# Patient Record
Sex: MALE | Race: WHITE | NOT HISPANIC OR LATINO | Employment: FULL TIME | URBAN - METROPOLITAN AREA
[De-identification: names, ages, dates, MRNs, and addresses within clinical notes are randomized per-mention and may not be internally consistent; named-entity substitution may affect disease eponyms.]

---

## 2023-11-30 DIAGNOSIS — L65.9 NONSCARRING HAIR LOSS, UNSPECIFIED: ICD-10-CM

## 2023-12-04 NOTE — TELEPHONE ENCOUNTER
Pt called back regarding this message stating that he was supposed to have refills on this med and that you prescribed this medication for his hair loss. Please advise

## 2023-12-04 NOTE — TELEPHONE ENCOUNTER
Please advise on medication d/t dx code and listed PCP. Pt has been seen by Dr Drake several times. Last appt 3/21/23

## 2023-12-05 RX ORDER — DOXYCYCLINE HYCLATE 50 MG/1
50 CAPSULE ORAL DAILY
Qty: 90 CAPSULE | OUTPATIENT
Start: 2023-12-05 | End: 2024-03-04

## 2023-12-05 NOTE — TELEPHONE ENCOUNTER
Pt states nevermind on this RX when I told him he hasn't been seen in this office in a while and that we had to schedule a physical or a med review.

## 2023-12-11 PROBLEM — F41.1 GENERALIZED ANXIETY DISORDER: Status: ACTIVE | Noted: 2023-12-11

## 2023-12-13 ENCOUNTER — APPOINTMENT (OUTPATIENT)
Dept: PRIMARY CARE | Facility: CLINIC | Age: 28
End: 2023-12-13
Payer: COMMERCIAL

## 2023-12-18 PROBLEM — Z78.9 MEDICAL HOME PATIENT: Status: ACTIVE | Noted: 2023-12-18

## 2023-12-18 PROBLEM — G47.00 INSOMNIA: Status: ACTIVE | Noted: 2023-12-18

## 2024-01-18 ENCOUNTER — OFFICE VISIT (OUTPATIENT)
Dept: UROLOGY | Facility: HOSPITAL | Age: 29
End: 2024-01-18
Payer: COMMERCIAL

## 2024-01-18 DIAGNOSIS — I86.1 VARICOCELE: ICD-10-CM

## 2024-01-18 DIAGNOSIS — R39.9 LOWER URINARY TRACT SYMPTOMS (LUTS): Primary | ICD-10-CM

## 2024-01-18 LAB
POC APPEARANCE, URINE: CLEAR
POC BILIRUBIN, URINE: NEGATIVE
POC BLOOD, URINE: NEGATIVE
POC COLOR, URINE: YELLOW
POC GLUCOSE, URINE: NEGATIVE MG/DL
POC KETONES, URINE: NEGATIVE MG/DL
POC LEUKOCYTES, URINE: NEGATIVE
POC NITRITE,URINE: NEGATIVE
POC PH, URINE: 7.5 PH
POC PROTEIN, URINE: NEGATIVE MG/DL
POC SPECIFIC GRAVITY, URINE: 1.02
POC UROBILINOGEN, URINE: 0.2 EU/DL

## 2024-01-18 PROCEDURE — 99214 OFFICE O/P EST MOD 30 MIN: CPT | Performed by: UROLOGY

## 2024-01-18 PROCEDURE — 99204 OFFICE O/P NEW MOD 45 MIN: CPT | Performed by: UROLOGY

## 2024-01-18 PROCEDURE — 81003 URINALYSIS AUTO W/O SCOPE: CPT | Performed by: UROLOGY

## 2024-01-18 NOTE — PROGRESS NOTES
NAME:John Backh  DATE: 1/18/2024               Subjective:   Chief complaint: hydrocele    HPI:  28 y.o. 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  Concerned he has a varicocele.  Has occasional dull pain.  Happens couple days a week.  Concerned about fertility.      Urinating ok.     Not actively trying to have kids at this time.  Dating.       No past medical history on file.  No past surgical history on file.  Social History     Tobacco Use    Smoking status: Not on file    Smokeless tobacco: Not on file   Substance Use Topics    Alcohol use: Not on file     No family history on file.  [unfilled]  Current Outpatient Medications on File Prior to Visit   Medication Sig Dispense Refill    [DISCONTINUED] sertraline (Zoloft) 50 mg tablet Take 1 tablet (50 mg) by mouth once daily.       No current facility-administered medications on file prior to visit.     John has no allergies on file.     Review of Systems    14 point ROS reviewed and discussed with the patient. Pertinent positives/negatives discussed in the History of Present Illness (HPI).      Objective:     There is no height or weight on file to calculate BMI.   There were no vitals taken for this visit.     Physical Exam   1. Constitutional: NAD, Well-developed, Well-nourished  2. Respiratory: Unlabored breathing, no audible wheezes, no use of accessory muscles   3. Cardiovascular: No JVD, extremities perfused, no edema  4. Abdomen: Soft, non-tender, non-distended, no masses or hernia.  No CVA tenderness.    5. Skin: no visible lesions, plaque, rashes, jaundice  6. Neuro: Gait normal, no focal neurologic deficit  7. Psychiatric: Mood and affect normal and appropriate, alert and oriented  8. :    Phallus: Normal, no lesions.     Meatus: Orthotopic and patent.   Testes:  Descended bilaterally, symmetric, without tenderness or mass.   Epididymis is normal bilaterally.  No hydrocele.  Grade I left  varicocele.   Scrotum: No lesions.   Inguinal canal: No hernia or tenderness.       Labs    Lab Results   Component Value Date    CREATININE 1.0 04/28/2022     Lab Results   Component Value Date    CHOL 150 04/28/2022     Lab Results   Component Value Date    HDL 57 04/28/2022     Lab Results   Component Value Date    CHHDL 2.6 04/28/2022     Lab Results   Component Value Date    TRIG 33 (L) 04/28/2022     Lab Results   Component Value Date    HCT 45.3 04/28/2022       Imaging    Procedures:     PVR:    Assessment/Plan:   John Dangelo presents with       1. Lower urinary tract symptoms (LUTS)    2. Varicocele      Varicocele    We discussed that varicoceles are extremely common, seen in approximately 15% of the general population, and even more some in primary and secondary infertility, 35% and 75% respectively.  Also, that it is the most common surgically correctable cause of male infertility.      We discussed the indications for varicocelectomy being a clinically palpable varicocele with documented infertility and an abnormal semen analysis.  Other indications include pain, hypogonadism, and testicular hypotrophy.    Different surgical approaches were discussed, with my preference being a microsurgical subinguinal incision, due to it lower recurrence and complication rates.  Complications including, but not limited too, pain, bleeding, infection, hydrocele, recurrence or nonresolution, injury to vas deferens, injury to the arterial supply resulting in testicular atrophy, as well as anesthetic complications were discussed.     Will get SA

## 2024-01-25 ENCOUNTER — ANCILLARY PROCEDURE (OUTPATIENT)
Dept: ENDOCRINOLOGY | Facility: CLINIC | Age: 29
End: 2024-01-25
Payer: COMMERCIAL

## 2024-01-25 DIAGNOSIS — I86.1 VARICOCELE: ICD-10-CM

## 2024-01-25 LAB
% EX RESIDUAL CYTOPLASM (SEMEN): 0 %
% HEAD DEFECTS (SEMEN): 99 %
% NECK MIDPIECE (SEMEN): 44.5 %
% NORMAL (SEMEN): 1 % (ref 4–?)
% TAIL DEFECTS (SEMEN): 9.5 %
ABSTINENCE (DAYS): 2 DAYS (ref 2–7)
AGGLUTINATION (SEMEN): NO
ANALYZED TIME:: ABNORMAL
ANDROLOGY LAB ID#: ABNORMAL
CLUMPS (SEMEN): YES
COLLECTED COMPLETELY: YES
COLLECTION LOCATION:: ABNORMAL
COLLECTION METHOD:: ABNORMAL
CONCENTRATION(SEMEN): 35.4 MILL/ML (ref 15–?)
DEBRIS (SEMEN): YES
NON PROG. MOTILITY (SEMEN): 14 %
PROG. MOTILITY (SEMEN): 57 % (ref 32–?)
RECEIVED TIME:: ABNORMAL
SEMEN APPEARANCE: NORMAL
SEMEN LIQUEFACTION: NORMAL
SEMEN VISCOSITY: NORMAL
TOT. NO OF NORM. MOTILE SPERM (SEMEN): 0.5 MILL
TOT. NO OF NORM. SPERM (SEMEN): 0.35 MILL
TOTAL MOTILITY (SEMEN): 71 % (ref 40–?)
TOTAL NO OF MOTILE (SEMEN): 35.06 MILL
TOTAL NO OF SPERM (SEMEN): 49.56 MILL (ref 39–?)
VOLUME (SEMEN): 1.4 ML (ref 1.5–?)

## 2024-01-25 PROCEDURE — 89322 SEMEN ANAL STRICT CRITERIA: CPT | Performed by: OBSTETRICS & GYNECOLOGY

## 2024-01-29 NOTE — PROGRESS NOTES
FUV    Virtual or Telephone Consent    An interactive audio and video telecommunication system which permits real time communications between the patient (at the originating site) and provider (at the distant site) was utilized to provide this telehealth service.   Verbal consent was requested and obtained from John Dangelo on this date, 01/30/24 for a telehealth visit.      Last seen - 1/18/24     HISTORY OF PRESENT ILLNESS:   John Dangelo is a 28 y.o. male who is being seen today for fuv.    Pt seen prior for varicocele.  Pt with left varicocele.  Minimal discomfort.      SA (1/25/24) - 1.4/35/57%/1% TMS 35.06    Not trying to have kids at this time    PAST MEDICAL HISTORY:  No past medical history on file.    PAST SURGICAL HISTORY:  No past surgical history on file.     ALLERGIES:   Not on File     MEDICATIONS:   Current Outpatient Medications   Medication Instructions        PHYSICAL EXAM:  There were no vitals taken for this visit.  Constitutional: Patient appears well-developed and well-nourished. No distress.    Pulmonary/Chest: Effort normal. No respiratory distress.   Musculoskeletal: Normal range of motion.    Neurological: Alert and oriented to person, place, and time.  Psychiatric: Normal mood and affect. Behavior is normal. Thought content normal.      Labs  Lab Results   Component Value Date    CREATININE 1.0 04/28/2022     Lab Results   Component Value Date    CHOL 150 04/28/2022     Lab Results   Component Value Date    HDL 57 04/28/2022     Lab Results   Component Value Date    CHHDL 2.6 04/28/2022     Lab Results   Component Value Date    TRIG 33 (L) 04/28/2022     Lab Results   Component Value Date    HCT 45.3 04/28/2022       Assessment:      1. Varicocele        2. Teratospermia          John Dangelo is a 28 y.o. male here for FUV    Reviewed SA - teratospermia.       Plan:   1)  Discussed SA and options for treating his varicocele.  He is minimally symptomatic.  Not actively trying to have a  child at this time.      Will continue to monitor and fu if symptoms change, having trouble conceiving or any other questions or concerns

## 2024-01-30 ENCOUNTER — TELEMEDICINE (OUTPATIENT)
Dept: UROLOGY | Facility: HOSPITAL | Age: 29
End: 2024-01-30
Payer: COMMERCIAL

## 2024-01-30 DIAGNOSIS — I86.1 VARICOCELE: Primary | ICD-10-CM

## 2024-01-30 DIAGNOSIS — R86.8 TERATOSPERMIA: ICD-10-CM

## 2024-01-30 PROCEDURE — 99213 OFFICE O/P EST LOW 20 MIN: CPT | Performed by: UROLOGY

## 2024-06-05 ENCOUNTER — LAB REQUISITION (OUTPATIENT)
Dept: LAB | Facility: HOSPITAL | Age: 29
End: 2024-06-05
Payer: COMMERCIAL

## 2024-06-05 DIAGNOSIS — J02.9 ACUTE PHARYNGITIS, UNSPECIFIED: ICD-10-CM

## 2024-06-05 PROCEDURE — 87651 STREP A DNA AMP PROBE: CPT

## 2024-06-06 LAB — S PYO DNA THROAT QL NAA+PROBE: NOT DETECTED

## 2024-11-18 ENCOUNTER — APPOINTMENT (OUTPATIENT)
Dept: PRIMARY CARE | Facility: CLINIC | Age: 29
End: 2024-11-18
Payer: COMMERCIAL

## 2024-11-18 VITALS
WEIGHT: 168 LBS | HEART RATE: 100 BPM | HEIGHT: 70 IN | OXYGEN SATURATION: 97 % | BODY MASS INDEX: 24.05 KG/M2 | TEMPERATURE: 98 F | DIASTOLIC BLOOD PRESSURE: 70 MMHG | SYSTOLIC BLOOD PRESSURE: 112 MMHG

## 2024-11-18 DIAGNOSIS — Z13.220 LIPID SCREENING: ICD-10-CM

## 2024-11-18 DIAGNOSIS — R10.31 RIGHT LOWER QUADRANT ABDOMINAL PAIN: Primary | ICD-10-CM

## 2024-11-18 DIAGNOSIS — Z23 ENCOUNTER FOR IMMUNIZATION: ICD-10-CM

## 2024-11-18 PROCEDURE — 3008F BODY MASS INDEX DOCD: CPT | Performed by: STUDENT IN AN ORGANIZED HEALTH CARE EDUCATION/TRAINING PROGRAM

## 2024-11-18 PROCEDURE — 99203 OFFICE O/P NEW LOW 30 MIN: CPT | Performed by: STUDENT IN AN ORGANIZED HEALTH CARE EDUCATION/TRAINING PROGRAM

## 2024-11-18 RX ORDER — DOXYCYCLINE 100 MG/1
1 TABLET ORAL
COMMUNITY
Start: 2024-10-13

## 2024-11-18 RX ORDER — CIPROFLOXACIN 250 MG/1
1 TABLET, FILM COATED ORAL
COMMUNITY
Start: 2024-10-23

## 2024-11-18 ASSESSMENT — ENCOUNTER SYMPTOMS
MYALGIAS: 0
NAUSEA: 0
DYSPHORIC MOOD: 0
SINUS PRESSURE: 0
PALPITATIONS: 0
DIZZINESS: 0
LIGHT-HEADEDNESS: 0
COUGH: 0
POLYDIPSIA: 0
CONSTIPATION: 0
SLEEP DISTURBANCE: 0
SINUS PAIN: 0
FEVER: 0
ABDOMINAL PAIN: 1
SHORTNESS OF BREATH: 0
VOMITING: 0
FREQUENCY: 0
NERVOUS/ANXIOUS: 0
RHINORRHEA: 0
DYSURIA: 0
FATIGUE: 0
HEADACHES: 0
DIARRHEA: 0
WOUND: 0
CHILLS: 0
ARTHRALGIAS: 0
WHEEZING: 0

## 2024-11-18 ASSESSMENT — PATIENT HEALTH QUESTIONNAIRE - PHQ9
SUM OF ALL RESPONSES TO PHQ9 QUESTIONS 1 AND 2: 1
1. LITTLE INTEREST OR PLEASURE IN DOING THINGS: SEVERAL DAYS
2. FEELING DOWN, DEPRESSED OR HOPELESS: NOT AT ALL

## 2024-11-18 ASSESSMENT — PAIN SCALES - GENERAL: PAINLEVEL_OUTOF10: 2

## 2024-11-18 NOTE — PROGRESS NOTES
"Subjective   Patient ID: John Dangelo is a 29 y.o. male who presents for New Patient Visit and Establish Care.    Here today to establish care and with a chief complaint of right lower quadrant abdominal pain.    Has been having RLQ for approximately 2 months.  Had a telehealth appointment and was prescribed doxycycline, which helped, but it returned as soon as he stopped taking it.  Had another telehealth appointment and was prescribed cipro, which also helped while he was taking it but then symptoms returned.             Review of Systems   Constitutional:  Negative for chills, fatigue and fever.   HENT:  Negative for congestion, hearing loss, rhinorrhea, sinus pressure, sinus pain and tinnitus.    Eyes:  Negative for visual disturbance.   Respiratory:  Negative for cough, shortness of breath and wheezing.    Cardiovascular:  Negative for chest pain, palpitations and leg swelling.   Gastrointestinal:  Positive for abdominal pain. Negative for constipation, diarrhea, nausea and vomiting.   Endocrine: Negative for cold intolerance, heat intolerance, polydipsia and polyuria.   Genitourinary:  Negative for dysuria, frequency and urgency.   Musculoskeletal:  Negative for arthralgias and myalgias.   Skin:  Negative for pallor, rash and wound.   Neurological:  Negative for dizziness, light-headedness and headaches.   Psychiatric/Behavioral:  Negative for dysphoric mood and sleep disturbance. The patient is not nervous/anxious.        Objective     Visit Vitals  /70 (BP Location: Left arm)   Pulse 100   Temp 36.7 °C (98 °F) (Temporal)   Ht 1.778 m (5' 10\")   Wt 76.2 kg (168 lb)   SpO2 97%   BMI 24.11 kg/m²   Smoking Status Every Day   BSA 1.94 m²         Physical Exam  Constitutional:       Appearance: Normal appearance. He is obese.   HENT:      Head: Normocephalic and atraumatic.      Right Ear: Tympanic membrane, ear canal and external ear normal.      Left Ear: Tympanic membrane, ear canal and external ear " normal.      Nose: Nose normal.      Mouth/Throat:      Mouth: Mucous membranes are moist.      Pharynx: Oropharynx is clear.   Eyes:      Extraocular Movements: Extraocular movements intact.      Conjunctiva/sclera: Conjunctivae normal.      Pupils: Pupils are equal, round, and reactive to light.   Cardiovascular:      Rate and Rhythm: Normal rate and regular rhythm.      Pulses: Normal pulses.      Heart sounds: Normal heart sounds.   Pulmonary:      Effort: Pulmonary effort is normal.      Breath sounds: Normal breath sounds.   Abdominal:      General: There is no distension.      Palpations: Abdomen is soft. There is no mass.      Tenderness: There is abdominal tenderness (Tender to palpation of McBurney's point). There is rebound.      Hernia: No hernia is present.   Musculoskeletal:         General: Normal range of motion.   Skin:     General: Skin is warm and dry.   Neurological:      Mental Status: He is alert and oriented to person, place, and time. Mental status is at baseline.   Psychiatric:         Mood and Affect: Mood normal.         Behavior: Behavior normal.         Assessment & Plan  Right lower quadrant abdominal pain    Orders:    US pelvis appendix; Future  Symptoms are consistent with a smoldering appendicitis.  Will obtain an ultrasound.  If this is not diagnostic, we could consider CAT scan of abdomen pelvis.  Lipid screening    Orders:    Comprehensive metabolic panel; Future    CBC; Future    Lipid panel; Future  This would need to be done prior to any CT scan.  Encounter for immunization       Patient appears to have been minimally immunized during childhood.  He thinks he may have had his immunizations and there are not records.  He is to check with his parents and see if this is the case.  He is likely due for Tdap booster, even if everything else was completed.     Reviewed and approved by GOLDEN JURADO on 11/18/24 at 8:19 PM.

## 2024-11-19 ENCOUNTER — LAB (OUTPATIENT)
Dept: LAB | Facility: LAB | Age: 29
End: 2024-11-19
Payer: COMMERCIAL

## 2024-11-19 ENCOUNTER — HOSPITAL ENCOUNTER (OUTPATIENT)
Dept: RADIOLOGY | Facility: HOSPITAL | Age: 29
Discharge: HOME | End: 2024-11-19
Payer: COMMERCIAL

## 2024-11-19 DIAGNOSIS — R10.31 RIGHT LOWER QUADRANT ABDOMINAL PAIN: ICD-10-CM

## 2024-11-19 DIAGNOSIS — Z13.220 LIPID SCREENING: ICD-10-CM

## 2024-11-19 LAB
ALBUMIN SERPL BCP-MCNC: 5.2 G/DL (ref 3.4–5)
ALP SERPL-CCNC: 46 U/L (ref 33–120)
ALT SERPL W P-5'-P-CCNC: 18 U/L (ref 10–52)
ANION GAP SERPL CALC-SCNC: 13 MMOL/L (ref 10–20)
AST SERPL W P-5'-P-CCNC: 17 U/L (ref 9–39)
BILIRUB SERPL-MCNC: 0.5 MG/DL (ref 0–1.2)
BUN SERPL-MCNC: 14 MG/DL (ref 6–23)
CALCIUM SERPL-MCNC: 9.8 MG/DL (ref 8.6–10.6)
CHLORIDE SERPL-SCNC: 101 MMOL/L (ref 98–107)
CO2 SERPL-SCNC: 29 MMOL/L (ref 21–32)
CREAT SERPL-MCNC: 0.96 MG/DL (ref 0.5–1.3)
EGFRCR SERPLBLD CKD-EPI 2021: >90 ML/MIN/1.73M*2
ERYTHROCYTE [DISTWIDTH] IN BLOOD BY AUTOMATED COUNT: 11.9 % (ref 11.5–14.5)
GLUCOSE SERPL-MCNC: 87 MG/DL (ref 74–99)
HCT VFR BLD AUTO: 47.1 % (ref 41–52)
HGB BLD-MCNC: 15.6 G/DL (ref 13.5–17.5)
MCH RBC QN AUTO: 31.1 PG (ref 26–34)
MCHC RBC AUTO-ENTMCNC: 33.1 G/DL (ref 32–36)
MCV RBC AUTO: 94 FL (ref 80–100)
NRBC BLD-RTO: 0 /100 WBCS (ref 0–0)
PLATELET # BLD AUTO: 288 X10*3/UL (ref 150–450)
POTASSIUM SERPL-SCNC: 4.6 MMOL/L (ref 3.5–5.3)
PROT SERPL-MCNC: 7.7 G/DL (ref 6.4–8.2)
RBC # BLD AUTO: 5.01 X10*6/UL (ref 4.5–5.9)
SODIUM SERPL-SCNC: 138 MMOL/L (ref 136–145)
WBC # BLD AUTO: 5.6 X10*3/UL (ref 4.4–11.3)

## 2024-11-19 PROCEDURE — 76857 US EXAM PELVIC LIMITED: CPT | Performed by: RADIOLOGY

## 2024-11-19 PROCEDURE — 76705 ECHO EXAM OF ABDOMEN: CPT

## 2024-11-19 PROCEDURE — 80053 COMPREHEN METABOLIC PANEL: CPT

## 2024-11-19 PROCEDURE — 36415 COLL VENOUS BLD VENIPUNCTURE: CPT

## 2024-11-19 PROCEDURE — 85027 COMPLETE CBC AUTOMATED: CPT

## 2024-11-20 ENCOUNTER — TELEPHONE (OUTPATIENT)
Dept: PRIMARY CARE | Facility: CLINIC | Age: 29
End: 2024-11-20
Payer: COMMERCIAL

## 2024-11-20 DIAGNOSIS — R10.31 RIGHT LOWER QUADRANT ABDOMINAL PAIN: Primary | ICD-10-CM

## 2024-11-20 NOTE — TELEPHONE ENCOUNTER
Patients mother Pebbles calling due to patient being in a lot of pain. She did not know on a scale what his pain was but enough to leave work today . He drove to work, but had to leave due to being in pain. They know the ultrasound results are not back yet. Should patient go to the ER if he is in pain? He is also constipated as well on top of what he feels with his appendix. Please advise and I can call patient to inform of A advice.

## 2024-11-20 NOTE — TELEPHONE ENCOUNTER
Patient just got results, Patient would like a stat Cat scan ordered.    Patient can be reached at [phone]

## 2024-11-20 NOTE — TELEPHONE ENCOUNTER
Spoke to patient, he was unaware his mother had called. Yes he states to be in a lot of pain, however, he does not want to go to the ER and will wait to hear the ultrasound results. He does not feel like it is excruciating pain, but he does describe it as a lot of pain. Advised him that [facility] stated to go to ER if the pain was that bad or worsening but patient declined to do that at this time.

## 2024-11-21 ENCOUNTER — HOSPITAL ENCOUNTER (OUTPATIENT)
Dept: RADIOLOGY | Facility: CLINIC | Age: 29
Discharge: HOME | End: 2024-11-21
Payer: COMMERCIAL

## 2024-11-21 DIAGNOSIS — R10.31 RIGHT LOWER QUADRANT ABDOMINAL PAIN: Primary | ICD-10-CM

## 2024-11-21 DIAGNOSIS — R10.31 RIGHT LOWER QUADRANT ABDOMINAL PAIN: ICD-10-CM

## 2024-11-21 PROCEDURE — 74177 CT ABD & PELVIS W/CONTRAST: CPT

## 2024-11-21 PROCEDURE — 2550000001 HC RX 255 CONTRASTS: Performed by: STUDENT IN AN ORGANIZED HEALTH CARE EDUCATION/TRAINING PROGRAM

## 2024-11-21 PROCEDURE — 74177 CT ABD & PELVIS W/CONTRAST: CPT | Performed by: STUDENT IN AN ORGANIZED HEALTH CARE EDUCATION/TRAINING PROGRAM

## 2024-11-21 RX ORDER — POLYETHYLENE GLYCOL 3350 17 G/17G
17 POWDER, FOR SOLUTION ORAL DAILY
Qty: 14 PACKET | Refills: 0 | Status: SHIPPED | OUTPATIENT
Start: 2024-11-21 | End: 2024-12-05

## 2024-11-21 NOTE — RESULT ENCOUNTER NOTE
CT did not show appendicitis.  It was mostly normal apart from a large amount of stool in the colon.  Would recommend daily miralax for next several days.  Rx sent to pharmacy.  Make sure to drink plenty of water while taking.

## 2024-11-25 ENCOUNTER — TELEPHONE (OUTPATIENT)
Dept: PRIMARY CARE | Facility: CLINIC | Age: 29
End: 2024-11-25
Payer: COMMERCIAL

## 2024-11-25 NOTE — TELEPHONE ENCOUNTER
Pt is taking the rx that was called in , still has not a bm.  Please advise , wanted to know if we would do an enema in the office.  He did try an otc one and it did not help.

## 2024-11-25 NOTE — TELEPHONE ENCOUNTER
Patient's Mom Pebbles called and would like to know if [facility] would give Patient an enema. She stated Patient hasn't gone to the bathroom and with the medication that  was prescribed Patient had diarrhea but he's still in Pain. Patient doesn't want to go to the ER because it would be $3200 out of pocket. Pebbles stated she called [facility] due to Patient's work schedule. Pebbles asked if the message would be sent with [facility] [employer].    Please advise    Patient can be reached at [phone]

## 2024-11-26 ENCOUNTER — TELEPHONE (OUTPATIENT)
Dept: PRIMARY CARE | Facility: CLINIC | Age: 29
End: 2024-11-26
Payer: COMMERCIAL

## 2024-11-26 ENCOUNTER — APPOINTMENT (OUTPATIENT)
Dept: PRIMARY CARE | Facility: CLINIC | Age: 29
End: 2024-11-26
Payer: COMMERCIAL

## 2024-11-27 ENCOUNTER — TELEPHONE (OUTPATIENT)
Dept: PRIMARY CARE | Facility: CLINIC | Age: 29
End: 2024-11-27

## 2024-11-27 ENCOUNTER — HOSPITAL ENCOUNTER (OUTPATIENT)
Dept: RADIOLOGY | Facility: HOSPITAL | Age: 29
Discharge: HOME | End: 2024-11-27
Payer: COMMERCIAL

## 2024-11-27 DIAGNOSIS — R10.11 RIGHT UPPER QUADRANT PAIN: ICD-10-CM

## 2024-11-27 PROCEDURE — 76705 ECHO EXAM OF ABDOMEN: CPT

## 2024-11-27 PROCEDURE — 76705 ECHO EXAM OF ABDOMEN: CPT | Performed by: RADIOLOGY

## 2024-11-27 NOTE — TELEPHONE ENCOUNTER
Pt is upset with the care that  [facility] has provided. He had imaging done for abd pain and was told it was stool, when he went to see GI he was told he had a kidney stone. He is very upset that [facility] never told him that . Pt wants to switch pcp  from  [facility] to  [facility]. [phone].  Please advise

## 2024-12-10 ENCOUNTER — OFFICE VISIT (OUTPATIENT)
Dept: SURGERY | Facility: CLINIC | Age: 29
End: 2024-12-10
Payer: COMMERCIAL

## 2024-12-10 ENCOUNTER — HOSPITAL ENCOUNTER (OUTPATIENT)
Facility: HOSPITAL | Age: 29
Setting detail: SURGERY ADMIT
End: 2024-12-10
Attending: STUDENT IN AN ORGANIZED HEALTH CARE EDUCATION/TRAINING PROGRAM | Admitting: STUDENT IN AN ORGANIZED HEALTH CARE EDUCATION/TRAINING PROGRAM
Payer: COMMERCIAL

## 2024-12-10 VITALS
HEART RATE: 102 BPM | DIASTOLIC BLOOD PRESSURE: 78 MMHG | HEIGHT: 71 IN | RESPIRATION RATE: 17 BRPM | SYSTOLIC BLOOD PRESSURE: 132 MMHG | BODY MASS INDEX: 23.52 KG/M2 | WEIGHT: 168 LBS | TEMPERATURE: 98.4 F

## 2024-12-10 DIAGNOSIS — G89.29 ABDOMINAL PAIN, CHRONIC, RIGHT LOWER QUADRANT: ICD-10-CM

## 2024-12-10 DIAGNOSIS — K80.10 CALCULUS OF GALLBLADDER WITH CHRONIC CHOLECYSTITIS WITHOUT OBSTRUCTION: Primary | ICD-10-CM

## 2024-12-10 DIAGNOSIS — R10.31 ABDOMINAL PAIN, CHRONIC, RIGHT LOWER QUADRANT: ICD-10-CM

## 2024-12-10 PROCEDURE — 99215 OFFICE O/P EST HI 40 MIN: CPT | Performed by: STUDENT IN AN ORGANIZED HEALTH CARE EDUCATION/TRAINING PROGRAM

## 2024-12-10 PROCEDURE — 99205 OFFICE O/P NEW HI 60 MIN: CPT | Performed by: STUDENT IN AN ORGANIZED HEALTH CARE EDUCATION/TRAINING PROGRAM

## 2024-12-10 PROCEDURE — 3008F BODY MASS INDEX DOCD: CPT | Performed by: STUDENT IN AN ORGANIZED HEALTH CARE EDUCATION/TRAINING PROGRAM

## 2024-12-10 ASSESSMENT — ENCOUNTER SYMPTOMS
OCCASIONAL FEELINGS OF UNSTEADINESS: 0
LOSS OF SENSATION IN FEET: 0
DEPRESSION: 0

## 2024-12-10 ASSESSMENT — PATIENT HEALTH QUESTIONNAIRE - PHQ9
2. FEELING DOWN, DEPRESSED OR HOPELESS: NOT AT ALL
1. LITTLE INTEREST OR PLEASURE IN DOING THINGS: NOT AT ALL
SUM OF ALL RESPONSES TO PHQ9 QUESTIONS 1 & 2: 0

## 2024-12-10 ASSESSMENT — LIFESTYLE VARIABLES
HOW OFTEN DO YOU HAVE A DRINK CONTAINING ALCOHOL: 2-3 TIMES A WEEK
HOW MANY STANDARD DRINKS CONTAINING ALCOHOL DO YOU HAVE ON A TYPICAL DAY: 1 OR 2
SKIP TO QUESTIONS 9-10: 1
HOW OFTEN DO YOU HAVE SIX OR MORE DRINKS ON ONE OCCASION: NEVER
AUDIT-C TOTAL SCORE: 3

## 2024-12-10 ASSESSMENT — COLUMBIA-SUICIDE SEVERITY RATING SCALE - C-SSRS
6. HAVE YOU EVER DONE ANYTHING, STARTED TO DO ANYTHING, OR PREPARED TO DO ANYTHING TO END YOUR LIFE?: NO
2. HAVE YOU ACTUALLY HAD ANY THOUGHTS OF KILLING YOURSELF?: NO
1. IN THE PAST MONTH, HAVE YOU WISHED YOU WERE DEAD OR WISHED YOU COULD GO TO SLEEP AND NOT WAKE UP?: NO

## 2024-12-10 ASSESSMENT — PAIN SCALES - GENERAL: PAINLEVEL_OUTOF10: 6

## 2024-12-11 ENCOUNTER — TELEPHONE (OUTPATIENT)
Dept: SURGERY | Facility: CLINIC | Age: 29
End: 2024-12-11
Payer: COMMERCIAL

## 2024-12-11 ASSESSMENT — ENCOUNTER SYMPTOMS
MUSCULOSKELETAL NEGATIVE: 1
DIARRHEA: 0
BLOOD IN STOOL: 0
RECTAL PAIN: 0
ANAL BLEEDING: 0
APPETITE CHANGE: 0
RESPIRATORY NEGATIVE: 1
EYES NEGATIVE: 1
DIAPHORESIS: 0
NEUROLOGICAL NEGATIVE: 1
ABDOMINAL PAIN: 1
CARDIOVASCULAR NEGATIVE: 1
ABDOMINAL DISTENTION: 0
ENDOCRINE NEGATIVE: 1
VOMITING: 0
HEMATOLOGIC/LYMPHATIC NEGATIVE: 1
CONSTIPATION: 0
FATIGUE: 0
NAUSEA: 0
FEVER: 0
UNEXPECTED WEIGHT CHANGE: 0
ALLERGIC/IMMUNOLOGIC NEGATIVE: 1
PSYCHIATRIC NEGATIVE: 1

## 2024-12-11 NOTE — TELEPHONE ENCOUNTER
----- Message from Diya WIHTE sent at 12/10/2024  3:37 PM EST -----  Regarding: [facility]:  I callled and there is NO Prior Auth REQUIRED for the surgery on 12/26/24 w/ Dr. Dinh at [facility]  ([id number]).

## 2024-12-12 ENCOUNTER — TELEPHONE (OUTPATIENT)
Dept: SURGERY | Facility: CLINIC | Age: 29
End: 2024-12-12
Payer: COMMERCIAL

## 2024-12-12 NOTE — TELEPHONE ENCOUNTER
Received call from Scheduling rep stating that the pt cancelled  PAT appt and that he may be cancelling surgery also.   Pt is scheduled for lap priscila with Dr. Bynum on 12/26/24.  LM on pt's vmail to return the call back to the office.

## 2024-12-13 ENCOUNTER — APPOINTMENT (OUTPATIENT)
Dept: PRIMARY CARE | Facility: CLINIC | Age: 29
End: 2024-12-13
Payer: COMMERCIAL

## 2024-12-17 NOTE — TELEPHONE ENCOUNTER
[facility] message sent to pt to call the office back or send us a   Message to confirm if he is keeping his scheduled surgery date of 12/26/24   Or cancelling the surgery.

## 2024-12-17 NOTE — TELEPHONE ENCOUNTER
Attempted to reach pt at listed # no answer and then phone rings busy.  Unable to leave a message.

## 2024-12-19 ENCOUNTER — APPOINTMENT (OUTPATIENT)
Dept: PREADMISSION TESTING | Facility: HOSPITAL | Age: 29
End: 2024-12-19
Payer: COMMERCIAL

## 2025-01-07 ENCOUNTER — APPOINTMENT (OUTPATIENT)
Dept: SURGERY | Facility: CLINIC | Age: 30
End: 2025-01-07
Payer: COMMERCIAL

## 2025-07-29 ENCOUNTER — OFFICE VISIT (OUTPATIENT)
Dept: URGENT CARE | Age: 30
End: 2025-07-29
Payer: COMMERCIAL

## 2025-07-29 VITALS
OXYGEN SATURATION: 98 % | DIASTOLIC BLOOD PRESSURE: 71 MMHG | HEART RATE: 87 BPM | SYSTOLIC BLOOD PRESSURE: 116 MMHG | TEMPERATURE: 98.5 F | RESPIRATION RATE: 19 BRPM

## 2025-07-29 DIAGNOSIS — J02.0 STREPTOCOCCAL SORE THROAT: Primary | ICD-10-CM

## 2025-07-29 LAB
POC HUMAN RHINOVIRUS PCR: NEGATIVE
POC INFLUENZA A VIRUS PCR: NEGATIVE
POC INFLUENZA B VIRUS PCR: NEGATIVE
POC RESPIRATORY SYNCYTIAL VIRUS PCR: NEGATIVE
POC STREPTOCOCCUS PYOGENES (GROUP A STREP) PCR: POSITIVE

## 2025-07-29 PROCEDURE — 87631 RESP VIRUS 3-5 TARGETS: CPT | Performed by: PHYSICIAN ASSISTANT

## 2025-07-29 PROCEDURE — 87651 STREP A DNA AMP PROBE: CPT | Performed by: PHYSICIAN ASSISTANT

## 2025-07-29 PROCEDURE — 99214 OFFICE O/P EST MOD 30 MIN: CPT | Performed by: PHYSICIAN ASSISTANT

## 2025-07-29 RX ORDER — AMOXICILLIN 500 MG/1
500 CAPSULE ORAL EVERY 12 HOURS SCHEDULED
Qty: 20 CAPSULE | Refills: 0 | Status: SHIPPED | OUTPATIENT
Start: 2025-07-29 | End: 2025-08-08

## 2025-07-29 ASSESSMENT — PATIENT HEALTH QUESTIONNAIRE - PHQ9
2. FEELING DOWN, DEPRESSED OR HOPELESS: NOT AT ALL
SUM OF ALL RESPONSES TO PHQ9 QUESTIONS 1 AND 2: 0
1. LITTLE INTEREST OR PLEASURE IN DOING THINGS: NOT AT ALL

## 2025-07-29 ASSESSMENT — ENCOUNTER SYMPTOMS: SORE THROAT: 1

## 2025-07-29 NOTE — PATIENT INSTRUCTIONS
Please follow up with your primary provider within one week if symptoms do not improve.  You may schedule an appointment online at [email or web address] or call [phone]. Go to the Emergency Department if symptoms significantly worsen or if you develop chest pain or shortness of breath.     Change your pillowcase and toothbrush within 24 hours

## 2025-07-29 NOTE — LETTER
July 29, 2025     Patient: John Dangelo   YOB: 1995   Date of Visit: 7/29/2025       To Whom It May Concern:    John Dangelo was seen in my clinic on 7/29/2025 at 8:45 am. Please excuse John for his absence from work on this day to make the appointment.    May return once on antibiotics and fever free for 24 hours.     If you have any questions or concerns, please don't hesitate to call.         Sincerely,         Alex Vaughn PA-C        CC: No Recipients

## 2025-07-29 NOTE — PROGRESS NOTES
Subjective   Patient ID: John Dangelo is a 30 y.o. male. They present today with a chief complaint of Sore Throat (2 days.).    History of Present Illness  Reports worsening sore throat over the past 2 days.  Denies nasal congestion, runny nose, earache, cough, chest pain, shortness of breath, known exposure, fever, rash.  Remains able to eat and drink      Sore Throat         Past Medical History  Allergies as of 07/29/2025    (No Known Allergies)       Prescriptions Prior to Admission[1]     Medical History[2]    Surgical History[3]     reports that he has been smoking cigarettes. He has been exposed to tobacco smoke. He has never used smokeless tobacco. He reports current alcohol use of about 2.0 - 3.0 standard drinks of alcohol per week. He reports that he does not use drugs.    Review of Systems  Pertinent systems reviewed and were negative unless otherwise stated in HPI.    Objective    Vitals:    07/29/25 0851   BP: 116/71   Pulse: 87   Resp: 19   Temp: 36.9 °C (98.5 °F)   TempSrc: Oral   SpO2: 98%     No LMP for male patient.    Physical Exam  Constitutional:       General: He is not in acute distress.  HENT:      Right Ear: Tympanic membrane, ear canal and external ear normal.      Left Ear: Tympanic membrane, ear canal and external ear normal.      Nose: No congestion.      Mouth/Throat:      Mouth: Mucous membranes are moist.      Pharynx: Oropharyngeal exudate (scant, right) and posterior oropharyngeal erythema present.      Comments: 1+ bilateral    Eyes:      Conjunctiva/sclera: Conjunctivae normal.       Cardiovascular:      Rate and Rhythm: Normal rate and regular rhythm.   Pulmonary:      Effort: Pulmonary effort is normal. No respiratory distress.   Lymphadenopathy:      Cervical: Cervical adenopathy present.     Skin:     Findings: No rash.       Results for orders placed or performed in visit on 07/29/25   POCT SPOTFIRE R/ST Panel Mini w/Strep A (Vitriflex) manually resulted    Collection Time:  07/29/25  9:41 AM   Result Value Ref Range    POC Group A Strep, PCR Positive (A) Negative    POC Respiratory Syncytial Virus PCR Negative Negative    POC Influenza A Virus PCR Negative Negative    POC Influenza B Virus PCR Negative Negative    POC Human Rhinovirus PCR Negative Negative      Diagnostic study results (if any) were reviewed by Alex Vaughn PA-C.    Assessment/Plan   Allergies, medications, history, and pertinent labs/EKGs/imaging reviewed by Alex Vaughn PA-C.     Medical Decision Making  Low concern for PTA. Tolerating PO    Orders and Diagnoses  Diagnoses and all orders for this visit:  Streptococcal sore throat  -     amoxicillin (Amoxil) 500 mg capsule; Take 1 capsule (500 mg) by mouth every 12 hours for 10 days. Discard remainder    Medical Admin Record    Disposition: Home    Electronically signed by Alex Vaughn PA-C           [1] (Not in a hospital admission)   [2] No past medical history on file.  [3]   Past Surgical History:  Procedure Laterality Date    KNEE SURGERY

## 2025-08-21 ENCOUNTER — APPOINTMENT (OUTPATIENT)
Dept: URGENT CARE | Age: 30
End: 2025-08-21
Payer: COMMERCIAL